# Patient Record
Sex: FEMALE | Race: WHITE | NOT HISPANIC OR LATINO | Employment: UNEMPLOYED | URBAN - NONMETROPOLITAN AREA
[De-identification: names, ages, dates, MRNs, and addresses within clinical notes are randomized per-mention and may not be internally consistent; named-entity substitution may affect disease eponyms.]

---

## 2023-12-06 ENCOUNTER — OFFICE VISIT (OUTPATIENT)
Dept: URGENT CARE | Facility: CLINIC | Age: 53
End: 2023-12-06
Payer: COMMERCIAL

## 2023-12-06 VITALS
HEART RATE: 58 BPM | SYSTOLIC BLOOD PRESSURE: 115 MMHG | DIASTOLIC BLOOD PRESSURE: 76 MMHG | RESPIRATION RATE: 18 BRPM | OXYGEN SATURATION: 98 % | TEMPERATURE: 98.2 F

## 2023-12-06 DIAGNOSIS — K52.9 NONINFECTIOUS GASTROENTERITIS, UNSPECIFIED TYPE: Primary | ICD-10-CM

## 2023-12-06 PROCEDURE — S9088 SERVICES PROVIDED IN URGENT: HCPCS | Performed by: PHYSICIAN ASSISTANT

## 2023-12-06 PROCEDURE — 99203 OFFICE O/P NEW LOW 30 MIN: CPT | Performed by: PHYSICIAN ASSISTANT

## 2023-12-06 PROCEDURE — 87636 SARSCOV2 & INF A&B AMP PRB: CPT | Performed by: PHYSICIAN ASSISTANT

## 2023-12-06 RX ORDER — ALPRAZOLAM 0.5 MG/1
TABLET ORAL
COMMUNITY
Start: 2023-12-04

## 2023-12-06 RX ORDER — MECLIZINE HCL 12.5 MG/1
TABLET ORAL
COMMUNITY
Start: 2023-11-15

## 2023-12-06 RX ORDER — PANTOPRAZOLE SODIUM 40 MG/1
TABLET, DELAYED RELEASE ORAL
COMMUNITY
Start: 2023-12-04

## 2023-12-06 RX ORDER — DEXTROMETHORPHAN HYDROBROMIDE AND PROMETHAZINE HYDROCHLORIDE 15; 6.25 MG/5ML; MG/5ML
5 SYRUP ORAL 3 TIMES DAILY
Qty: 60 ML | Refills: 0 | Status: SHIPPED | OUTPATIENT
Start: 2023-12-06

## 2023-12-06 RX ORDER — LINACLOTIDE 290 UG/1
CAPSULE, GELATIN COATED ORAL
COMMUNITY
Start: 2023-11-09

## 2023-12-06 RX ORDER — CYCLOBENZAPRINE HCL 5 MG
TABLET ORAL
COMMUNITY
Start: 2023-11-15

## 2023-12-06 RX ORDER — DULOXETIN HYDROCHLORIDE 30 MG/1
30 CAPSULE, DELAYED RELEASE ORAL DAILY
COMMUNITY
Start: 2023-02-21 | End: 2024-02-21

## 2023-12-06 RX ORDER — DULOXETIN HYDROCHLORIDE 60 MG/1
1 CAPSULE, DELAYED RELEASE ORAL DAILY
COMMUNITY
Start: 2023-10-18

## 2023-12-06 RX ORDER — DIPHENOXYLATE HYDROCHLORIDE AND ATROPINE SULFATE 2.5; .025 MG/1; MG/1
1 TABLET ORAL EVERY MORNING
COMMUNITY

## 2023-12-06 RX ORDER — LORATADINE 10 MG/1
1 TABLET ORAL EVERY MORNING
COMMUNITY

## 2023-12-06 RX ORDER — GABAPENTIN 300 MG/1
1 CAPSULE ORAL 3 TIMES DAILY
COMMUNITY
Start: 2023-09-21

## 2023-12-06 NOTE — PATIENT INSTRUCTIONS
Abdominal Pain   WHAT YOU NEED TO KNOW:   Abdominal pain can be dull, achy, or sharp. You may have pain in one area of your abdomen, or in your entire abdomen. Your pain may be caused by a condition such as constipation, food sensitivity or poisoning, infection, or a blockage. Abdominal pain can also be from a hernia, appendicitis, or an ulcer. Liver, gallbladder, or kidney conditions can also cause abdominal pain. The cause of your abdominal pain may not be known. DISCHARGE INSTRUCTIONS:   Call your local emergency number (911 in the [address]) if:   You have chest pain or shortness of breath. Return to the emergency department if:   You have pulsing pain in your upper abdomen or lower back that suddenly becomes constant. Your pain is in the right lower abdominal area and worsens with movement. You have a fever over 100.4°F (38°C) or shaking chills. You are vomiting and cannot keep food or liquids down. Your pain does not improve or gets worse over the next 8 to 12 hours. You see blood in your vomit or bowel movements, or they look black and tarry. Your skin or the whites of your eyes turn yellow. You are a woman and have a large amount of vaginal bleeding that is not your monthly period. Call your doctor if:   You have pain in your lower back. You are a man and have pain in your testicles. You have pain when you urinate. You have questions or concerns about your condition or care. Medicines: You may need any of the following:  Medicines  may be given to calm your stomach or prevent vomiting. Prescription pain medicine  may be given. Ask your healthcare provider how to take this medicine safely. Some prescription pain medicines contain acetaminophen. Do not take other medicines that contain acetaminophen without talking to your healthcare provider. Too much acetaminophen may cause liver damage. Prescription pain medicine may cause constipation.  Ask your healthcare Radha Herman was admitted to [facility] from ED via wheelchair .   Reason for hospitalization is RUE Cellulitis.   Upon arrival, patient is stable. Patient has history significant for IV Drug abuse.  Patient oriented to bed, call light, , room and unit.  Patient provided with the following educational materials upon admission:safety, advanced directives, infection control and pain.   Level of understanding patient verbalized understanding.   Admission orders received at this time.   Dr. Kirkpatrick notified of patient arrival.   See Epic documentation for patient individualized nursing care plan.   provider how to prevent or treat constipation. Take your medicine as directed. Contact your healthcare provider if you think your medicine is not helping or if you have side effects. Tell your provider if you are allergic to any medicine. Keep a list of the medicines, vitamins, and herbs you take. Include the amounts, and when and why you take them. Bring the list or the pill bottles to follow-up visits. Carry your medicine list with you in case of an emergency. Manage or prevent abdominal pain:   Apply heat  on your abdomen for 20 to 30 minutes every 2 hours for as many days as directed. Heat helps decrease pain and muscle spasms. Make changes to the foods you eat, if needed. Do not eat foods that cause abdominal pain or other symptoms. Eat small meals more often. The following changes may also help:    Eat more high-fiber foods if you are constipated. High-fiber foods include fruits, vegetables, whole-grain foods, and legumes such as boo beans. Do not eat foods that cause gas if you have bloating. Examples include broccoli, cabbage, beans, and carbonated drinks. Do not eat foods or drinks that contain sorbitol or fructose if you have diarrhea and bloating. Some examples are fruit juices, candy, jelly, and sugar-free gum. Do not eat high-fat foods. Examples include fried foods, cheeseburgers, hot dogs, and desserts. Make changes to the liquids you drink, if needed. Do not drink liquids that cause pain or make it worse, such as orange juice. Drink liquids throughout the day to stay hydrated. The following changes may also help:    Drink more liquids to prevent dehydration from diarrhea or vomiting. Ask your healthcare provider how much liquid to drink each day and which liquids are best for you. Limit or do not have caffeine. Caffeine may make symptoms such as heartburn or nausea worse. Limit or do not drink alcohol. Alcohol can make your abdominal pain worse.  Ask your healthcare provider if it is okay for you to drink alcohol. Also ask how much is okay for you to drink. A drink of alcohol is 12 ounces of beer, ½ ounce of liquor, or 5 ounces of wine. Keep a diary of your abdominal pain. A diary may help your healthcare provider learn what is causing your pain. Include when the pain happens, how long it lasts, and what the pain feels like. Write down any other symptoms you have with abdominal pain. Also write down what you eat, and any symptoms you have after you eat. Manage stress. Stress may cause abdominal pain. Your healthcare provider may recommend relaxation techniques and deep breathing exercises to help decrease your stress. Your healthcare provider may recommend you talk to someone about your stress or anxiety, such as a counselor or a friend. Get plenty of sleep. Exercise regularly. Do not smoke. Nicotine and other chemicals in cigarettes can damage your esophagus and stomach. Ask your healthcare provider for information if you currently smoke and need help to quit. E-cigarettes or smokeless tobacco still contain nicotine. Talk to your healthcare provider before you use these products. Follow up with your doctor as directed:  Write down your questions so you remember to ask them during your visits. © Copyright Burtis Anuradha 2023 Information is for End User's use only and may not be sold, redistributed or otherwise used for commercial purposes. The above information is an  only. It is not intended as medical advice for individual conditions or treatments. Talk to your doctor, nurse or pharmacist before following any medical regimen to see if it is safe and effective for you.

## 2023-12-06 NOTE — PROGRESS NOTES
[facility]        NAME: Shila Hopkins is a 46 y.o. female  : 1970    MRN: [id number]  DATE: 2023  TIME: 2:36 PM    Assessment and Plan   Noninfectious gastroenteritis, unspecified type [K52.9]  1. Noninfectious gastroenteritis, unspecified type  Covid/Flu-Office Collect    promethazine-dextromethorphan (PHENERGAN-DM) 6.25-15 mg/5 mL oral syrup            Patient Instructions     Patient Instructions   Abdominal Pain   WHAT YOU NEED TO KNOW:   Abdominal pain can be dull, achy, or sharp. You may have pain in one area of your abdomen, or in your entire abdomen. Your pain may be caused by a condition such as constipation, food sensitivity or poisoning, infection, or a blockage. Abdominal pain can also be from a hernia, appendicitis, or an ulcer. Liver, gallbladder, or kidney conditions can also cause abdominal pain. The cause of your abdominal pain may not be known. DISCHARGE INSTRUCTIONS:   Call your local emergency number (911 in the [address]) if:   You have chest pain or shortness of breath. Return to the emergency department if:   You have pulsing pain in your upper abdomen or lower back that suddenly becomes constant. Your pain is in the right lower abdominal area and worsens with movement. You have a fever over 100.4°F (38°C) or shaking chills. You are vomiting and cannot keep food or liquids down. Your pain does not improve or gets worse over the next 8 to 12 hours. You see blood in your vomit or bowel movements, or they look black and tarry. Your skin or the whites of your eyes turn yellow. You are a woman and have a large amount of vaginal bleeding that is not your monthly period. Call your doctor if:   You have pain in your lower back. You are a man and have pain in your testicles. You have pain when you urinate. You have questions or concerns about your condition or care. Medicines:   You may need any of the following:  Medicines may be given to calm your stomach or prevent vomiting. Prescription pain medicine  may be given. Ask your healthcare provider how to take this medicine safely. Some prescription pain medicines contain acetaminophen. Do not take other medicines that contain acetaminophen without talking to your healthcare provider. Too much acetaminophen may cause liver damage. Prescription pain medicine may cause constipation. Ask your healthcare provider how to prevent or treat constipation. Take your medicine as directed. Contact your healthcare provider if you think your medicine is not helping or if you have side effects. Tell your provider if you are allergic to any medicine. Keep a list of the medicines, vitamins, and herbs you take. Include the amounts, and when and why you take them. Bring the list or the pill bottles to follow-up visits. Carry your medicine list with you in case of an emergency. Manage or prevent abdominal pain:   Apply heat  on your abdomen for 20 to 30 minutes every 2 hours for as many days as directed. Heat helps decrease pain and muscle spasms. Make changes to the foods you eat, if needed. Do not eat foods that cause abdominal pain or other symptoms. Eat small meals more often. The following changes may also help:    Eat more high-fiber foods if you are constipated. High-fiber foods include fruits, vegetables, whole-grain foods, and legumes such as boo beans. Do not eat foods that cause gas if you have bloating. Examples include broccoli, cabbage, beans, and carbonated drinks. Do not eat foods or drinks that contain sorbitol or fructose if you have diarrhea and bloating. Some examples are fruit juices, candy, jelly, and sugar-free gum. Do not eat high-fat foods. Examples include fried foods, cheeseburgers, hot dogs, and desserts. Make changes to the liquids you drink, if needed. Do not drink liquids that cause pain or make it worse, such as orange juice.  Drink liquids throughout the day to stay hydrated. The following changes may also help:    Drink more liquids to prevent dehydration from diarrhea or vomiting. Ask your healthcare provider how much liquid to drink each day and which liquids are best for you. Limit or do not have caffeine. Caffeine may make symptoms such as heartburn or nausea worse. Limit or do not drink alcohol. Alcohol can make your abdominal pain worse. Ask your healthcare provider if it is okay for you to drink alcohol. Also ask how much is okay for you to drink. A drink of alcohol is 12 ounces of beer, ½ ounce of liquor, or 5 ounces of wine. Keep a diary of your abdominal pain. A diary may help your healthcare provider learn what is causing your pain. Include when the pain happens, how long it lasts, and what the pain feels like. Write down any other symptoms you have with abdominal pain. Also write down what you eat, and any symptoms you have after you eat. Manage stress. Stress may cause abdominal pain. Your healthcare provider may recommend relaxation techniques and deep breathing exercises to help decrease your stress. Your healthcare provider may recommend you talk to someone about your stress or anxiety, such as a counselor or a friend. Get plenty of sleep. Exercise regularly. Do not smoke. Nicotine and other chemicals in cigarettes can damage your esophagus and stomach. Ask your healthcare provider for information if you currently smoke and need help to quit. E-cigarettes or smokeless tobacco still contain nicotine. Talk to your healthcare provider before you use these products. Follow up with your doctor as directed:  Write down your questions so you remember to ask them during your visits. © Copyright Theresa Gayle 2023 Information is for End User's use only and may not be sold, redistributed or otherwise used for commercial purposes. The above information is an  only.  It is not intended as medical advice for individual conditions or treatments. Talk to your doctor, nurse or pharmacist before following any medical regimen to see if it is safe and effective for you. Follow up with PCP in 3-5 days. Proceed to  ER if symptoms worsen. Chief Complaint     Chief Complaint   Patient presents with    Vomiting     With some diarrhea (constantly) chills and cough onset intermittent one week worse since yesterday         History of Present Illness       The patient presents the clinic for vomiting and diarrhea for the past 24 hours. 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 She also has a history of chronic constipation. She states that she is on Linzess for her chronic constipation. She states that she is due to have another colonoscopy soon. She also has had a cough and fatigue. She states that she is up-to-date on her flu and COVID-vaccine. He has had some associated upper respiratory symptoms. Review of Systems   Review of Systems   Constitutional:  Positive for fatigue. Negative for chills and fever. HENT:  Negative for congestion, ear pain, mouth sores, postnasal drip, rhinorrhea, sinus pain, sneezing and sore throat. Eyes:  Negative for pain and visual disturbance. Respiratory:  Positive for cough. Negative for shortness of breath. Cardiovascular:  Negative for chest pain and palpitations. Gastrointestinal:  Positive for abdominal pain, diarrhea and vomiting. Genitourinary:  Negative for dysuria and hematuria. Musculoskeletal:  Negative for arthralgias and back pain. Skin:  Negative for color change and rash. Neurological:  Negative for dizziness, seizures, syncope and headaches. All other systems reviewed and are negative.         Current Medications       Current Outpatient Medications:     ALPRAZolam (XANAX) 0.5 mg tablet, , Disp: , Rfl:     cyclobenzaprine (FLEXERIL) 5 mg tablet, , Disp: , Rfl:     DULoxetine (CYMBALTA) 30 mg delayed release capsule, Take 30 mg by mouth daily, Disp: , Rfl:     DULoxetine (CYMBALTA) 60 mg delayed release capsule, Take 1 capsule by mouth daily, Disp: , Rfl:     gabapentin (NEURONTIN) 300 mg capsule, Take 1 capsule by mouth 3 (three) times a day, Disp: , Rfl:     Linzess 290 MCG CAPS, , Disp: , Rfl:     meclizine (ANTIVERT) 12.5 MG tablet, , Disp: , Rfl:     metoprolol tartrate (LOPRESSOR) 25 mg tablet, , Disp: , Rfl:     pantoprazole (PROTONIX) 40 mg tablet, , Disp: , Rfl:     promethazine-dextromethorphan (PHENERGAN-DM) 6.25-15 mg/5 mL oral syrup, Take 5 mL by mouth 3 (three) times a day, Disp: 60 mL, Rfl: 0    cyanocobalamin (VITAMIN B-12) 100 MCG tablet, Take 100 mcg by mouth daily, Disp: , Rfl:     Levonorgestrel (MIRENA) 20 MCG/DAY IUD, 1 each by Intrauterine route, Disp: , Rfl:     loratadine (CLARITIN) 10 mg tablet, Take 1 tablet by mouth every morning, Disp: , Rfl:     multivitamin (THERAGRAN) TABS, Take 1 tablet by mouth every morning, Disp: , Rfl:     Current Allergies     Allergies as of 12/06/2023    (No Known Allergies)            The following portions of the patient's history were reviewed and updated as appropriate: allergies, current medications, past family history, past medical history, past social history, past surgical history and problem list.     No past medical history on file. No past surgical history on file. No family history on file. Medications have been verified. Objective   /76   Pulse 58   Temp 98.2 °F (36.8 °C)   Resp 18   SpO2 98%        Physical Exam     Physical Exam  Constitutional:       Appearance: She is well-developed. She is not diaphoretic. HENT:      Head: Normocephalic. Eyes:      General:         Right eye: No discharge. Left eye: No discharge. Pupils: Pupils are equal, round, and reactive to light. Neck:      Thyroid: No thyromegaly. Cardiovascular:      Rate and Rhythm: Normal rate. Heart sounds: No murmur heard.   Pulmonary: Effort: Pulmonary effort is normal. No respiratory distress. Breath sounds: Rhonchi present. No wheezing or rales. Chest:      Chest wall: No tenderness. Abdominal:      General: Bowel sounds are increased. There is no distension. Palpations: Abdomen is soft. Tenderness: There is no abdominal tenderness. There is no guarding or rebound. Musculoskeletal:         General: Normal range of motion. Cervical back: Normal range of motion. Lymphadenopathy:      Cervical: No cervical adenopathy. Skin:     General: Skin is warm. Neurological:      Mental Status: She is alert and oriented to person, place, and time.               -That is likely viral.  I suggest supportive treatment for now.   She will follow-up with her PCP or go to the ER if symptoms worsen

## 2023-12-06 NOTE — LETTER
December 6, 2023     Patient: David Avalos   YOB: 1970   Date of Visit: 12/6/2023       To Whom it May Concern:    David Avalos was seen in my clinic on 12/6/2023. She  is being tested for flu and covid. If you have any questions or concerns, please don't hesitate to call.          Sincerely,          Ned Soulier, PA-C        CC: No Recipients

## 2023-12-07 LAB
FLUAV RNA RESP QL NAA+PROBE: NEGATIVE
FLUBV RNA RESP QL NAA+PROBE: NEGATIVE
SARS-COV-2 RNA RESP QL NAA+PROBE: NEGATIVE

## 2023-12-13 ENCOUNTER — APPOINTMENT (OUTPATIENT)
Dept: LAB | Facility: CLINIC | Age: 53
End: 2023-12-13
Payer: COMMERCIAL

## 2023-12-13 DIAGNOSIS — N30.90 CYSTITIS: ICD-10-CM

## 2023-12-13 PROCEDURE — 87086 URINE CULTURE/COLONY COUNT: CPT

## 2023-12-14 LAB — BACTERIA UR CULT: NORMAL
